# Patient Record
Sex: FEMALE | Race: WHITE | NOT HISPANIC OR LATINO | Employment: UNEMPLOYED | URBAN - METROPOLITAN AREA
[De-identification: names, ages, dates, MRNs, and addresses within clinical notes are randomized per-mention and may not be internally consistent; named-entity substitution may affect disease eponyms.]

---

## 2018-04-10 PROCEDURE — 88175 CYTOPATH C/V AUTO FLUID REDO: CPT | Performed by: OBSTETRICS & GYNECOLOGY

## 2019-02-28 ENCOUNTER — OFFICE VISIT (OUTPATIENT)
Dept: URGENT CARE | Facility: CLINIC | Age: 49
End: 2019-02-28
Payer: COMMERCIAL

## 2019-02-28 VITALS
OXYGEN SATURATION: 97 % | BODY MASS INDEX: 23.66 KG/M2 | WEIGHT: 142 LBS | DIASTOLIC BLOOD PRESSURE: 70 MMHG | HEART RATE: 76 BPM | TEMPERATURE: 98.1 F | RESPIRATION RATE: 14 BRPM | HEIGHT: 65 IN | SYSTOLIC BLOOD PRESSURE: 112 MMHG

## 2019-02-28 DIAGNOSIS — J11.1 INFLUENZA: ICD-10-CM

## 2019-02-28 LAB
FLUAV+FLUBV AG SPEC QL IA: NORMAL
INT CON NEG: NORMAL
INT CON POS: NORMAL

## 2019-02-28 PROCEDURE — 87804 INFLUENZA ASSAY W/OPTIC: CPT | Performed by: PHYSICIAN ASSISTANT

## 2019-02-28 PROCEDURE — 99203 OFFICE O/P NEW LOW 30 MIN: CPT | Performed by: PHYSICIAN ASSISTANT

## 2019-02-28 RX ORDER — OSELTAMIVIR PHOSPHATE 75 MG/1
75 CAPSULE ORAL 2 TIMES DAILY
Qty: 10 CAP | Refills: 0 | Status: SHIPPED | OUTPATIENT
Start: 2019-02-28 | End: 2020-10-06

## 2019-02-28 RX ORDER — FLUTICASONE PROPIONATE 50 MCG
1 SPRAY, SUSPENSION (ML) NASAL DAILY
Qty: 16 G | Refills: 0 | Status: SHIPPED | OUTPATIENT
Start: 2019-02-28 | End: 2020-10-06

## 2019-02-28 ASSESSMENT — ENCOUNTER SYMPTOMS
FEVER: 1
HEADACHES: 1
SORE THROAT: 0
ANOREXIA: 0
CHILLS: 1
COUGH: 1
VOMITING: 0
FATIGUE: 1
NAUSEA: 1

## 2019-02-28 NOTE — PROGRESS NOTES
"Subjective:   Desirae Hodge is a 48 y.o. female who presents for Cough (cough, fever, bodyaches, nausea, chills, headache x 3 days)        Received flu vaccine      Influenza   This is a new problem. The current episode started in the past 7 days (3 days). The problem occurs constantly. The problem has been waxing and waning. Associated symptoms include chills, congestion, coughing, fatigue, a fever (subjective fevers), headaches and nausea. Pertinent negatives include no anorexia, sore throat or vomiting. Nothing aggravates the symptoms. Treatments tried: advil q 7hrs. last dose 7 AM. The treatment provided no relief.     Review of Systems   Constitutional: Positive for chills, fatigue and fever (subjective fevers).   HENT: Positive for congestion. Negative for sore throat.    Respiratory: Positive for cough.    Gastrointestinal: Positive for nausea. Negative for anorexia and vomiting.   Neurological: Positive for headaches.       PMH:  has no past medical history on file.  MEDS:   Current Outpatient Prescriptions:   •  oseltamivir (TAMIFLU) 75 MG Cap, Take 1 Cap by mouth 2 times a day., Disp: 10 Cap, Rfl: 0  •  fluticasone (FLONASE) 50 MCG/ACT nasal spray, Spray 1 Spray in nose every day., Disp: 16 g, Rfl: 0  ALLERGIES:   Allergies   Allergen Reactions   • Aspirin      SURGHX: No past surgical history on file.  SOCHX:  reports that she has never smoked. She has never used smokeless tobacco.  FH: Family history was reviewed, no pertinent findings to report   Objective:   /70 (BP Location: Left arm, Patient Position: Sitting, BP Cuff Size: Adult)   Pulse 76   Temp 36.7 °C (98.1 °F) (Temporal)   Resp 14   Ht 1.651 m (5' 5\")   Wt 64.4 kg (142 lb)   SpO2 97%   BMI 23.63 kg/m²   Physical Exam   Constitutional: She appears well-developed and well-nourished.  Non-toxic appearance. No distress.   HENT:   Head: Normocephalic and atraumatic.   Right Ear: Tympanic membrane, external ear and ear canal normal. "   Left Ear: Tympanic membrane, external ear and ear canal normal.   Nose: Mucosal edema and rhinorrhea present.   Mouth/Throat: Uvula is midline and mucous membranes are normal.   Patient congested.  Occasional wet cough.  Posterior oropharynx and anterior peritonsillar pillars are erythematous.   Neck: Neck supple.   Cardiovascular: Normal rate, regular rhythm, S1 normal, S2 normal and normal heart sounds.    Pulmonary/Chest: Effort normal and breath sounds normal. No respiratory distress. She has no wheezes. She has no rhonchi. She has no rales.   Lymphadenopathy:   No anterior cervical lymphadenopathy.   Neurological: She is alert.   Skin: Skin is warm and dry.   Psychiatric: She has a normal mood and affect. Her behavior is normal. Thought content normal.   Vitals reviewed.        Assessment/Plan:   1. Influenza  - oseltamivir (TAMIFLU) 75 MG Cap; Take 1 Cap by mouth 2 times a day.  Dispense: 10 Cap; Refill: 0  - fluticasone (FLONASE) 50 MCG/ACT nasal spray; Spray 1 Spray in nose every day.  Dispense: 16 g; Refill: 0    Pt presentation consistent with influenza, high prevalence in community currently, and clinical suspicion is high. Testing positive for flu A.    VSS, no dyspnea, no SOB, and lungs CTA on PE.  Goals of care include symptomatic control and prevention of lower respiratory spread. Signs of lower respiratory involvement discussed with pt.  Pt instructed to RTC if any of these are observed.     Drink plenty of fluids and rest.  Use nasal saline TID to promote drainage.   Salt water gurgles to soothe sore throat.  Start OTC expectorant.  APAP for fever control, and NSAIDs for throat pain/headache relief prn.    If you fail to improve in 2-4 days or symptoms worsen/new symptoms develop, RTC for reevaluation.    If symptoms worsen or new symptoms develop to include uncontrolled fevers, dyspnea, SOB, or vomiting- pt was instructed to go to the ED for evaluation.    Differential diagnosis, natural history,  supportive care, and indications for immediate follow-up discussed.

## 2020-07-27 ENCOUNTER — HOSPITAL ENCOUNTER (OUTPATIENT)
Dept: RADIOLOGY | Facility: MEDICAL CENTER | Age: 50
End: 2020-07-27
Attending: OBSTETRICS & GYNECOLOGY
Payer: COMMERCIAL

## 2020-07-27 DIAGNOSIS — Z12.31 VISIT FOR SCREENING MAMMOGRAM: ICD-10-CM

## 2020-07-27 PROCEDURE — 77067 SCR MAMMO BI INCL CAD: CPT

## 2020-08-13 ENCOUNTER — HOSPITAL ENCOUNTER (OUTPATIENT)
Dept: RADIOLOGY | Facility: MEDICAL CENTER | Age: 50
End: 2020-08-13
Payer: COMMERCIAL

## 2020-09-29 ENCOUNTER — TELEPHONE (OUTPATIENT)
Dept: SCHEDULING | Facility: IMAGING CENTER | Age: 50
End: 2020-09-29

## 2020-10-06 ENCOUNTER — OFFICE VISIT (OUTPATIENT)
Dept: MEDICAL GROUP | Facility: IMAGING CENTER | Age: 50
End: 2020-10-06
Payer: COMMERCIAL

## 2020-10-06 VITALS
TEMPERATURE: 97.9 F | BODY MASS INDEX: 24.99 KG/M2 | DIASTOLIC BLOOD PRESSURE: 78 MMHG | RESPIRATION RATE: 12 BRPM | OXYGEN SATURATION: 99 % | SYSTOLIC BLOOD PRESSURE: 122 MMHG | HEIGHT: 65 IN | HEART RATE: 78 BPM | WEIGHT: 150 LBS

## 2020-10-06 DIAGNOSIS — S03.00XA DISLOCATION OF TEMPOROMANDIBULAR JOINT, INITIAL ENCOUNTER: ICD-10-CM

## 2020-10-06 DIAGNOSIS — N95.1 PERIMENOPAUSE: ICD-10-CM

## 2020-10-06 DIAGNOSIS — J30.2 SEASONAL ALLERGIES: ICD-10-CM

## 2020-10-06 DIAGNOSIS — G44.89 OTHER HEADACHE SYNDROME: ICD-10-CM

## 2020-10-06 PROCEDURE — 99204 OFFICE O/P NEW MOD 45 MIN: CPT | Performed by: FAMILY MEDICINE

## 2020-10-06 RX ORDER — IBUPROFEN 200 MG
200 TABLET ORAL EVERY 6 HOURS PRN
COMMUNITY

## 2020-10-06 RX ORDER — CYCLOBENZAPRINE HCL 5 MG
5 TABLET ORAL NIGHTLY PRN
Qty: 10 TAB | Refills: 0 | Status: SHIPPED | OUTPATIENT
Start: 2020-10-06 | End: 2020-10-16

## 2020-10-06 RX ORDER — ACETAMINOPHEN 325 MG/1
650 TABLET ORAL EVERY 4 HOURS PRN
COMMUNITY

## 2020-10-06 ASSESSMENT — ANXIETY QUESTIONNAIRES
6. BECOMING EASILY ANNOYED OR IRRITABLE: SEVERAL DAYS
2. NOT BEING ABLE TO STOP OR CONTROL WORRYING: SEVERAL DAYS
5. BEING SO RESTLESS THAT IT IS HARD TO SIT STILL: SEVERAL DAYS
7. FEELING AFRAID AS IF SOMETHING AWFUL MIGHT HAPPEN: SEVERAL DAYS
GAD7 TOTAL SCORE: 7
3. WORRYING TOO MUCH ABOUT DIFFERENT THINGS: SEVERAL DAYS
4. TROUBLE RELAXING: SEVERAL DAYS
1. FEELING NERVOUS, ANXIOUS, OR ON EDGE: SEVERAL DAYS

## 2020-10-06 ASSESSMENT — ENCOUNTER SYMPTOMS
DOUBLE VISION: 0
EYE PAIN: 0
SHORTNESS OF BREATH: 0
ABDOMINAL PAIN: 0
NAUSEA: 0
MYALGIAS: 0
COUGH: 0
FEVER: 0
HEADACHES: 1
DIARRHEA: 0
PALPITATIONS: 0
VOMITING: 0
CHILLS: 0
DIZZINESS: 1
WHEEZING: 0

## 2020-10-06 ASSESSMENT — PATIENT HEALTH QUESTIONNAIRE - PHQ9
5. POOR APPETITE OR OVEREATING: 0 - NOT AT ALL
SUM OF ALL RESPONSES TO PHQ QUESTIONS 1-9: 5
CLINICAL INTERPRETATION OF PHQ2 SCORE: 1

## 2020-10-06 ASSESSMENT — PAIN SCALES - GENERAL: PAINLEVEL: NO PAIN

## 2020-10-06 NOTE — PROGRESS NOTES
Chief Complaint   Patient presents with   • Establish Care   • Headache     HPI:  49 year old female here with concerns about headaches that are sudden in onset and happening monthly. Alternating tylenol and motrin. She has been having headaches for years though the headaches she is concerned about are different stabbing in nature lasting seconds. She can still function though she is uncomfortable. And feels almost hung over the following day though happening about once a month. She is noticing a pattern in the fall months as well. She is sneezing now with runny nose. No sinus pressure. Does not take anything for allergies. She gets a pressure in her head when she stands.   With Menopause. and Anxiety. With sleep disturbance due to hot flashes. Still having periods. Longest time between 3 months.     Headaches last all day.  aura/prodrome. In her 20s she had auras only while on birth control. Had one during her pregnancy other wise she does not get aura.   chest pain. none   She is taking otc nsaid. Then after 6 hours she will take two tylenol. Then another two advil at night if not gone. This occurs once per month.   new foods/meds/exposures/stress: none  LOCATION: occipital to the frontal   QUALITY:dull pressure throbbing. The one she had last week was stabbing. She feels that it cleared her sinus it was so intense that it startled her. It is on top of the head and the pariatal area not temporal.   She is not sleeping well. She is hot at night waking up.   RELIEVING FACTORS: otc meds  AGGRAVATING FACTORS: denies Light, Noise,  Dietary, Poor sleep, though with Season changes  ASSOCIATED SYMPTOMS:  Denies N/V, fevers/chills, , blurred or double vision, tinnitus, sinus pain or , swallowing difficulty, drooling, chest pain, SOB, profuse sweating, neck pain,  numbness, weakness, change in speech  With lightheadedness/dizziness, neck stiffness, jaw pain, allergies  She has had jaw pain on the right which comes before  "her period for many years. Tylenol usually helps.     Allergies   Allergen Reactions   • Aspirin      Current Outpatient Medications   Medication Sig Dispense Refill   • ibuprofen (MOTRIN) 200 MG Tab Take 200 mg by mouth every 6 hours as needed.     • acetaminophen (TYLENOL) 325 MG Tab Take 650 mg by mouth every four hours as needed.       No current facility-administered medications for this visit.      Social History     Tobacco Use   • Smoking status: Never Smoker   • Smokeless tobacco: Never Used   Substance Use Topics   • Alcohol use: Not Currently   • Drug use: Never     Family History   Problem Relation Age of Onset   • Cancer Mother         breast   • Diabetes Father    • Heart Disease Father    • Cancer Father         prostate       /78   Pulse 78   Temp 36.6 °C (97.9 °F)   Resp 12   Ht 1.651 m (5' 5\")   Wt 68 kg (150 lb)   SpO2 99%  Body mass index is 24.96 kg/m².  Review of Systems   Constitutional: Negative for chills, fever and malaise/fatigue.   HENT: Negative for hearing loss and tinnitus.    Eyes: Negative for double vision and pain.   Respiratory: Negative for cough, shortness of breath and wheezing.    Cardiovascular: Negative for chest pain, palpitations and leg swelling.   Gastrointestinal: Negative for abdominal pain, diarrhea, nausea and vomiting.   Genitourinary: Negative for dysuria and frequency.   Musculoskeletal: Negative for joint pain and myalgias.   Skin: Negative for itching and rash.   Neurological: Positive for dizziness and headaches.     Physical Exam   Constitutional: She is oriented to person, place, and time and well-developed, well-nourished, and in no distress. No distress.   HENT:   Head: Normocephalic and atraumatic.   Eyes: Pupils are equal, round, and reactive to light. Conjunctivae and EOM are normal. Right eye exhibits no discharge. Left eye exhibits no discharge. No scleral icterus.   Neck: No thyromegaly present.   Pulmonary/Chest: Effort normal. No " respiratory distress.   Abdominal: She exhibits no distension.   Musculoskeletal:         General: No edema.   Neurological: She is alert and oriented to person, place, and time. Gait normal.   Skin: Skin is warm and dry. She is not diaphoretic.   Psychiatric: Affect and judgment normal.     All labs (last 1 month):   No visits with results within 1 Month(s) from this visit.   Latest known visit with results is:   Office Visit on 02/28/2019   Component Date Value Ref Range Status   • Rapid Influenza A-B 02/28/2019 Positive A   Final   • Internal Control Positive 02/28/2019 Valid   Final   • Internal Control Negative 02/28/2019 Valid   Final       Lipids: No results found for: CHOLSTRLTOT, TRIGLYCERIDE, HDL, LDL    Imaging: No results found.    A/P    Return in about 4 weeks (around 11/3/2020) for headache.    Problem List Items Addressed This Visit     Other headache syndrome    Relevant Medications    ibuprofen (MOTRIN) 200 MG Tab    acetaminophen (TYLENOL) 325 MG Tab    cyclobenzaprine (FLEXERIL) 5 mg tablet    Perimenopause     Continues to have periods, some sleep disturbance due to hot flash.          Seasonal allergies     Try flonase         Dislocation of jaw     Follow up with dentist for further evaluation for mouth gaurd         Relevant Medications    cyclobenzaprine (FLEXERIL) 5 mg tablet        ASSESSMENT/PLAN FOR HEADACHE:  -Likely causes of her headaches:Tmj, seasonal allegeris, pressure changes, menopause  -Recommending daily Flonase, sending flexeril to use as needed can take half tab with tmj symptoms. F/u dentist  -Discussed HRT for menopause. Patient would like to hold off for now discussed ssri would like to hold off. Discussed mri brain for 49 year old with change in character of her headaches. She would like to hold off unless treatment does not resolve. I agree should be fine given that this occurs once monthly. rec trial of otc menopausal supplement with calcium and magnesium.   -She is  concerned about hrt due to her mother having breast cancer.   -Reviewed and Updated Medications  -If patient prescribed medicines then told to review package insert for any warnings, side effects, contra-indications and medication vs medication reactions.   -Follow Up 3-4 weeks  -Education and counseling  - Diagnosis and prognosis discussed with patient at length.   - Avoid triggers such as stress, sleep deprivation or irregular sleeping habits, alcohol, extreme weather conditions, and missing meals   - Reduce excessive caffeine and alcohol intake, smoking cessation.   - Avoid foods containing nitrates (hot dogs, preserved cold cuts) and monosodium glutamate (MSG)  - Avoid strong-smelling soaps, shampoos, perfumes and other substances.    - Don't overuse symptomatic medications. Restrict simple analgesics (tylenol, NSAIDs etc) to no more than 15 days per month and prescription medications for acute migraine treatment to no more than 10 days per month.   - Encouraged to set aside time for personal relaxation and stress reduction. Recommend regular exercise (moderate activity 30 minutes a day), proper hydration (at least 80oz daily water daily), and healthy diet.     -    Vitamin B2 200mg twice a day  -    Mag 400mg at bedtime check total daily sources from supplements  -    Melatonin 3mg at bedtime  -     Coenzyme Q 300mg    The patient understands and agrees that due to the complexity of his/her diagnosis, results of any testing and further recommendations will typically be discussed/made during a face to face encounter in my office. The patient and/or family further understands it is their responsibility to keep proper follow up.     Patient was seen for 45 minutes face to face of which > 50% of appointment time was spent on counseling and coordination of care regarding the above.    Portions of this note may be dictated using Dragon NaturallySpeaking voice recognition software.  Variances in spelling and  vocabulary are possible and unintentional.  Not all areas may be caught/corrected.  Please notify me if any discrepancies are noted or if the meaning of any statement is not correct/clear.

## 2020-10-06 NOTE — PATIENT INSTRUCTIONS
- Diagnosis and prognosis discussed with patient at length.   - Avoid triggers such as stress, sleep deprivation or irregular sleeping habits, alcohol, extreme weather conditions, and missing meals   - Reduce excessive caffeine and alcohol intake, smoking cessation.   - Avoid foods containing nitrates (hot dogs, preserved cold cuts) and monosodium glutamate (MSG)  - Avoid strong-smelling soaps, shampoos, perfumes and other substances.    - Don't overuse symptomatic medications. Restrict simple analgesics (tylenol, NSAIDs etc) to no more than 15 days per month and prescription medications for acute migraine treatment to no more than 10 days per month.   - Encouraged to set aside time for personal relaxation and stress reduction. Recommend regular exercise (moderate activity 30 minutes a day), proper hydration (at least 80oz daily water daily), and healthy diet.     -    Vitamin B2 200mg twice a day  -    Mag 400mg at bedtime   -    Melatonin 3mg at bedtime  -     Coenzyme Q 300mg

## 2021-01-05 ENCOUNTER — TELEPHONE (OUTPATIENT)
Dept: MEDICAL GROUP | Facility: IMAGING CENTER | Age: 51
End: 2021-01-05

## 2021-01-06 NOTE — TELEPHONE ENCOUNTER
VM received from patient requesting lab order prior to annual exam. Called pt back and left msg notifying her Dr. Barber will place orders at visit.

## 2021-10-08 ENCOUNTER — APPOINTMENT (RX ONLY)
Dept: URBAN - METROPOLITAN AREA CLINIC 4 | Facility: CLINIC | Age: 51
Setting detail: DERMATOLOGY
End: 2021-10-08

## 2021-10-08 DIAGNOSIS — L81.4 OTHER MELANIN HYPERPIGMENTATION: ICD-10-CM

## 2021-10-08 DIAGNOSIS — L82.1 OTHER SEBORRHEIC KERATOSIS: ICD-10-CM

## 2021-10-08 DIAGNOSIS — L44.8 OTHER SPECIFIED PAPULOSQUAMOUS DISORDERS: ICD-10-CM

## 2021-10-08 DIAGNOSIS — L57.0 ACTINIC KERATOSIS: ICD-10-CM

## 2021-10-08 DIAGNOSIS — Z71.89 OTHER SPECIFIED COUNSELING: ICD-10-CM

## 2021-10-08 DIAGNOSIS — D18.0 HEMANGIOMA: ICD-10-CM

## 2021-10-08 DIAGNOSIS — D22 MELANOCYTIC NEVI: ICD-10-CM

## 2021-10-08 PROBLEM — D18.01 HEMANGIOMA OF SKIN AND SUBCUTANEOUS TISSUE: Status: ACTIVE | Noted: 2021-10-08

## 2021-10-08 PROBLEM — D22.5 MELANOCYTIC NEVI OF TRUNK: Status: ACTIVE | Noted: 2021-10-08

## 2021-10-08 PROBLEM — D48.5 NEOPLASM OF UNCERTAIN BEHAVIOR OF SKIN: Status: ACTIVE | Noted: 2021-10-08

## 2021-10-08 PROBLEM — D23.39 OTHER BENIGN NEOPLASM OF SKIN OF OTHER PARTS OF FACE: Status: ACTIVE | Noted: 2021-10-08

## 2021-10-08 PROCEDURE — ? COUNSELING

## 2021-10-08 PROCEDURE — 11102 TANGNTL BX SKIN SINGLE LES: CPT

## 2021-10-08 PROCEDURE — ? BIOPSY BY SHAVE METHOD

## 2021-10-08 PROCEDURE — ? LIQUID NITROGEN

## 2021-10-08 PROCEDURE — 99203 OFFICE O/P NEW LOW 30 MIN: CPT | Mod: 25

## 2021-10-08 PROCEDURE — 17000 DESTRUCT PREMALG LESION: CPT | Mod: 59

## 2021-10-08 ASSESSMENT — LOCATION DETAILED DESCRIPTION DERM
LOCATION DETAILED: EPIGASTRIC SKIN
LOCATION DETAILED: RIGHT NASAL DORSUM
LOCATION DETAILED: MIDDLE STERNUM
LOCATION DETAILED: LOWER STERNUM
LOCATION DETAILED: RIGHT PROXIMAL DORSAL FOREARM
LOCATION DETAILED: SUPERIOR THORACIC SPINE
LOCATION DETAILED: SUBXIPHOID

## 2021-10-08 ASSESSMENT — LOCATION SIMPLE DESCRIPTION DERM
LOCATION SIMPLE: RIGHT FOREARM
LOCATION SIMPLE: ABDOMEN
LOCATION SIMPLE: CHEST
LOCATION SIMPLE: UPPER BACK
LOCATION SIMPLE: NOSE

## 2021-10-08 ASSESSMENT — LOCATION ZONE DERM
LOCATION ZONE: NOSE
LOCATION ZONE: TRUNK
LOCATION ZONE: ARM

## 2021-10-08 NOTE — PROCEDURE: MIPS QUALITY
Quality 226: Preventive Care And Screening: Tobacco Use: Screening And Cessation Intervention: Patient screened for tobacco use and is an ex/non-smoker
Detail Level: Detailed
Quality 130: Documentation Of Current Medications In The Medical Record: Current Medications Documented
Quality 402: Tobacco Use And Help With Quitting Among Adolescents: Patient screened for tobacco and never smoked
Quality 431: Preventive Care And Screening: Unhealthy Alcohol Use - Screening: Patient not identified as an unhealthy alcohol user when screened for unhealthy alcohol use using a systematic screening method

## 2021-10-08 NOTE — PROCEDURE: COUNSELING
Detail Level: Generalized
Sunscreen Recommendations: Recommended broad spectrum inorganic or physical sunscreens primarily containing zinc oxide or titanium dioxide.
Detail Level: Simple
Detail Level: Zone

## 2021-10-08 NOTE — PROCEDURE: BIOPSY BY SHAVE METHOD
Body Location Override (Optional - Billing Will Still Be Based On Selected Body Map Location If Applicable): Right forearm
Detail Level: Detailed
Depth Of Biopsy: dermis
Was A Bandage Applied: Yes
Size Of Lesion In Cm: 0
Biopsy Type: H and E
Biopsy Method: Dermablade
Anesthesia Type: 1% lidocaine with epinephrine
Anesthesia Volume In Cc: 1.5
Hemostasis: Drysol
Wound Care: Petrolatum
Dressing: bandage
Destruction After The Procedure: No
Type Of Destruction Used: Electrodesiccation and Curettage
Curettage Text: The wound bed was treated with curettage after the biopsy was performed.
Cryotherapy Text: The wound bed was treated with cryotherapy after the biopsy was performed.
Electrodesiccation Text: The wound bed was treated with electrodesiccation after the biopsy was performed.
Electrodesiccation And Curettage Text: The wound bed was treated with electrodesiccation and curettage after the biopsy was performed.
Silver Nitrate Text: The wound bed was treated with silver nitrate after the biopsy was performed.
Lab: [id number]
Lab Facility: 
Consent: Verbal consent was obtained and risks were reviewed including but not limited to scarring, infection, bleeding, scabbing, incomplete removal, nerve damage and allergy to anesthesia.
Post-Care Instructions: I reviewed with the patient in detail post-care instructions. Patient is to keep the biopsy site dry overnight, and then apply Vaseline twice daily until healed. Patient may apply hydrogen peroxide soaks to remove any crusting.
Notification Instructions: Patient will be notified of biopsy results. However, patient instructed to call the office if not contacted within 2 weeks.
Billing Type: Third-Party Bill
Information: Selecting Yes will display possible errors in your note based on the variables you have selected. This validation is only offered as a suggestion for you. PLEASE NOTE THAT THE VALIDATION TEXT WILL BE REMOVED WHEN YOU FINALIZE YOUR NOTE. IF YOU WANT TO FAX A PRELIMINARY NOTE YOU WILL NEED TO TOGGLE THIS TO 'NO' IF YOU DO NOT WANT IT IN YOUR FAXED NOTE.

## 2021-10-08 NOTE — PROCEDURE: LIQUID NITROGEN
Show Applicator Variable?: Yes
Number Of Freeze-Thaw Cycles: 2 freeze-thaw cycles
Consent: The patient's consent was obtained including but not limited to risks of crusting, scabbing, blistering, scarring, darker or lighter pigmentary change, recurrence, incomplete removal and infection.
Render Post-Care Instructions In Note?: no
Detail Level: Zone
Post-Care Instructions: I reviewed with the patient in detail post-care instructions. Patient is to wear sunprotection, and avoid picking at any of the treated lesions. Pt may apply Vaseline to crusted or scabbing areas.
Duration Of Freeze Thaw-Cycle (Seconds): 8